# Patient Record
Sex: FEMALE | Race: WHITE | NOT HISPANIC OR LATINO | Employment: FULL TIME | URBAN - METROPOLITAN AREA
[De-identification: names, ages, dates, MRNs, and addresses within clinical notes are randomized per-mention and may not be internally consistent; named-entity substitution may affect disease eponyms.]

---

## 2017-06-05 ENCOUNTER — GENERIC CONVERSION - ENCOUNTER (OUTPATIENT)
Dept: OTHER | Facility: OTHER | Age: 37
End: 2017-06-05

## 2017-06-27 ENCOUNTER — GENERIC CONVERSION - ENCOUNTER (OUTPATIENT)
Dept: OTHER | Facility: OTHER | Age: 37
End: 2017-06-27

## 2017-07-05 ENCOUNTER — ALLSCRIPTS OFFICE VISIT (OUTPATIENT)
Dept: PERINATAL CARE | Facility: CLINIC | Age: 37
End: 2017-07-05
Payer: COMMERCIAL

## 2017-07-05 ENCOUNTER — GENERIC CONVERSION - ENCOUNTER (OUTPATIENT)
Dept: OTHER | Facility: OTHER | Age: 37
End: 2017-07-05

## 2017-07-05 ENCOUNTER — APPOINTMENT (OUTPATIENT)
Dept: PERINATAL CARE | Facility: CLINIC | Age: 37
End: 2017-07-05
Payer: COMMERCIAL

## 2017-07-05 PROCEDURE — 59015 CHORION BIOPSY: CPT | Performed by: OBSTETRICS & GYNECOLOGY

## 2017-07-05 PROCEDURE — 76801 OB US < 14 WKS SINGLE FETUS: CPT | Performed by: OBSTETRICS & GYNECOLOGY

## 2017-07-05 PROCEDURE — 76813 OB US NUCHAL MEAS 1 GEST: CPT | Performed by: OBSTETRICS & GYNECOLOGY

## 2017-07-05 PROCEDURE — 99203 OFFICE O/P NEW LOW 30 MIN: CPT | Performed by: GENETIC COUNSELOR, MS

## 2017-07-05 PROCEDURE — 76945 ECHO GUIDE VILLUS SAMPLING: CPT | Performed by: OBSTETRICS & GYNECOLOGY

## 2017-07-10 ENCOUNTER — GENERIC CONVERSION - ENCOUNTER (OUTPATIENT)
Dept: OTHER | Facility: OTHER | Age: 37
End: 2017-07-10

## 2017-08-01 ENCOUNTER — ALLSCRIPTS OFFICE VISIT (OUTPATIENT)
Dept: PERINATAL CARE | Facility: CLINIC | Age: 37
End: 2017-08-01
Payer: COMMERCIAL

## 2017-08-01 ENCOUNTER — GENERIC CONVERSION - ENCOUNTER (OUTPATIENT)
Dept: OTHER | Facility: OTHER | Age: 37
End: 2017-08-01

## 2017-08-01 PROCEDURE — 76805 OB US >/= 14 WKS SNGL FETUS: CPT | Performed by: OBSTETRICS & GYNECOLOGY

## 2017-09-25 ENCOUNTER — ALLSCRIPTS OFFICE VISIT (OUTPATIENT)
Dept: PERINATAL CARE | Facility: CLINIC | Age: 37
End: 2017-09-25
Payer: COMMERCIAL

## 2017-09-25 ENCOUNTER — GENERIC CONVERSION - ENCOUNTER (OUTPATIENT)
Dept: OTHER | Facility: OTHER | Age: 37
End: 2017-09-25

## 2017-09-25 PROCEDURE — 76817 TRANSVAGINAL US OBSTETRIC: CPT | Performed by: OBSTETRICS & GYNECOLOGY

## 2017-09-25 PROCEDURE — 76811 OB US DETAILED SNGL FETUS: CPT | Performed by: OBSTETRICS & GYNECOLOGY

## 2017-10-23 ENCOUNTER — GENERIC CONVERSION - ENCOUNTER (OUTPATIENT)
Dept: OTHER | Facility: OTHER | Age: 37
End: 2017-10-23

## 2017-11-16 ENCOUNTER — GENERIC CONVERSION - ENCOUNTER (OUTPATIENT)
Dept: OTHER | Facility: OTHER | Age: 37
End: 2017-11-16

## 2017-12-01 ENCOUNTER — GENERIC CONVERSION - ENCOUNTER (OUTPATIENT)
Dept: OTHER | Facility: OTHER | Age: 37
End: 2017-12-01

## 2017-12-01 ENCOUNTER — APPOINTMENT (OUTPATIENT)
Dept: PERINATAL CARE | Facility: CLINIC | Age: 37
End: 2017-12-01
Payer: COMMERCIAL

## 2017-12-01 PROCEDURE — 76816 OB US FOLLOW-UP PER FETUS: CPT | Performed by: OBSTETRICS & GYNECOLOGY

## 2017-12-28 ENCOUNTER — LAB REQUISITION (OUTPATIENT)
Dept: LAB | Facility: HOSPITAL | Age: 37
End: 2017-12-28
Payer: COMMERCIAL

## 2017-12-28 DIAGNOSIS — Z34.00 ENCOUNTER FOR SUPERVISION OF NORMAL FIRST PREGNANCY: ICD-10-CM

## 2017-12-28 PROCEDURE — 87653 STREP B DNA AMP PROBE: CPT | Performed by: OBSTETRICS & GYNECOLOGY

## 2017-12-30 LAB — GP B STREP DNA SPEC QL NAA+PROBE: ABNORMAL

## 2018-01-09 NOTE — PROGRESS NOTES
SEP 25 2017         RE: Nelli Ross                             To: NHAN Lei    MR#: [id number]   : 1980   ENC: [id number]                             Fax: [phone]   (Exam #: [id number])      The LMP of this 40year old,  G3, P2-0-0-2 patient was 2017, giving   her an EFE of 2018 and a current gestational age of 20 weeks 3 days   by dates  A sonographic examination was performed on SEP 25 2017 using   real time equipment  The ultrasound examination was performed using   abdominal & vaginal techniques  The patient has a BMI of 26 9  Her blood   pressure today was 108/68  Earliest ultrasound found in her record: 17   8w5d  18 EFE      Problem list:   1  AMA   2  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   3  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 and 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 on 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  Amniocentesis shows 46XX  Cardiac motion was observed at 147 bpm       INDICATIONS      advanced maternal age   Weston's syndrome   fetal anatomical survey      Exam Types      LEVEL II   Transvaginal      RESULTS      Fetus # 1 of 1   Breech presentation   Placenta Location = Anterior   No placenta previa   Placenta Grade = I      MEASUREMENTS (* Included In Average GA)      AC              18 6 cm        23 weeks 1 day * (44%)   BPD              5 3 cm        22 weeks 1 day * (21%)   HC              20 5 cm        22 weeks 4 days* (21%)   Femur            4 2 cm        24 weeks 0 days* (52%)      Humerus          3 8 cm        23 weeks 2 days  (42%)      Cerebellum       2 6 cm        24 weeks 4 days   Biorbit          3 6 cm        22 weeks 6 days   CisternaMagna    5 9 mm      HC/AC           1 10   FL/AC           0 23   FL/BPD          0 79   EFW (Ac/Fl/Hc)   594 grams - 1 lbs 5 oz                 (40%)      THE AVERAGE GESTATIONAL AGE is 23 weeks 0 days +/- 14 days        AMNIOTIC FLUID         Largest Vertical Pocket = 7 5 cm   Amniotic Fluid: Normal      CERVICAL EVALUATION      SUPINE      Cervical Length: 3 33 cm      OTHER TEST RESULTS           Funneling?: No             Dynamic Changes?: No        Resp  To TFP?: No      ANATOMY      Head                                    Normal   Face/Neck                               Normal   Th  Cav  Normal   Heart                                   Normal   Abd  Cav  Normal   Stomach                                 Normal   Right Kidney                            Normal   Left Kidney                             Normal   Bladder                                 Normal   Abd  Wall                               Normal   Spine                                   Normal   Extrems                                 Normal   Genitalia                               Normal   Placenta                                Normal   Umbl  Cord                              Normal   Uterus                                  Normal   PCI                                     Normal      ANATOMY DETAILS      Visualized Appearing Sonographically Normal:   HEAD: (Calvarium, BPD Level, Cavum, Lateral Ventricles, Choroid Plexus,   Cerebellum, Cisterna Magna);    FACE/NECK: (Neck, Profile, Orbits,   Nose/Lips, Palate, Face);    TH  CAV  : (Lungs, Diaphragm); HEART: (Four   Chamber View, Proximal Left Outflow, Proximal Right Outflow, 3VV, 3 Vessel   Trachea, Short Axis of Greater Vessels, Ductal Arch, Aortic Arch,   Interventricular Septum, Interatrial Septum, IVC, SVC, Cardiac Axis,   Cardiac Position);    ABD  CAV : (Liver);    STOMACH, RIGHT KIDNEY, LEFT   KIDNEY, BLADDER, ABD  WALL, SPINE: (Cervical Spine, Thoracic Spine, Lumbar   Spine, Sacrum);    EXTREMS: (Lt Humerus, Rt Humerus, Lt Forearm, Rt   Forearm, Lt Hand, Rt Hand, Lt Femur, Rt Femur, Lt Low Leg, Rt Low Leg, Lt   Foot, Rt Foot);    GENITALIA, PLACENTA, UMBL   CORD, UTERUS, PCI      ANATOMY COMMENTS       Her survey of the fetal anatomy is complete  No fetal structural abnormality or ultrasound marker for aneuploidy is   identified on the Level II ultrasound study today  Fetal growth and   amniotic fluid volume appear normal   Active movement of the fetal body &   extremities was seen  There is no suspicion of a subchorionic bleed  The   placental cord insertion was normal       ADNEXA      The left ovary appeared normal and measured 2 7 x 2 8 x 0 9 cm with a   volume of 3 6 cc  The right ovary appeared normal and measured 1 9 x 2 4 x   1 2 cm with a volume of 2 9 cc  IMPRESSION      Wolfe IUP   23 weeks and 0 days by this ultrasound  (EFE=JAN 22 2018)   Breech presentation   Normal anatomy survey   Regular fetal heart rate of 147 bpm   Anterior placenta   No placenta previa      GENERAL COMMENT      Recommend a 32 week US for growth due to her hx of being advanced maternal   age  She can have this done locally  EVERARDO Roque M D     Maternal-Fetal Medicine   Electronically signed 09/25/17 15:37

## 2018-01-10 NOTE — PROGRESS NOTES
AUG 1 2017         RE: Kayla Feeling                             To: Michael Guzman,   M D    MR#: [id number]   : [address] [place] [address] Avenue: [id number]                             Fax: [phone]   (Exam #: [id number])      The LMP of this 40year old,  G3, P2-0-0-2 patient was 2017, giving   her an EFE of 2018 and a current gestational age of 14 weeks 4 days   by dates  A sonographic examination was performed on AUG 1 2017 using real   time equipment  The ultrasound examination was performed using abdominal   technique  The patient has a BMI of 24 5  Her blood pressure today was   107/58  Earliest ultrasound found in her record: 17   8w5d  18 EFE      Problem list:   1  AMA   2  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   3  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 and 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 on 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      Cardiac motion was observed at 148 bpm       INDICATIONS      advanced maternal age   Weston's syndrome      Exam Types      Level I   Amnio (15+ Wks) W/Sono Guidance      RESULTS      Fetus # 1 of 1   Breech presentation   Placenta Location = Anterior   No placenta previa   Placenta Grade = 0      AMNIOTIC FLUID         Largest Vertical Pocket = 3 9 cm   Amniotic Fluid: Normal      MEASUREMENTS (* Included In Average GA)      AC               9 8 cm        15 weeks 4 days* (58%)   BPD              3 1 cm        15 weeks 5 days*   HC              11 6 cm        15 weeks 4 days*   Femur            1 8 cm        15 weeks 1 day *      Nuchal Fold      2 4 mm      Humerus          1 8 cm        15 weeks 1 day   (39%)      Cerebellum       1 5 cm        15 weeks 5 days   CisternaMagna    2 7 mm      HC/AC           1 18   FL/AC           0 18   FL/BPD          0 57   EFW (Ac/Fl/Hc)   127 grams - 0 lbs 4 oz      THE AVERAGE GESTATIONAL AGE is 15 weeks 4 days +/- 7 days        ANATOMY      Head                                    See Details Face/Neck                               See Details   Th  Cav  Normal   Heart                                   See Details   Abd  Cav  Normal   Stomach                                 Normal   Right Kidney                            Normal   Left Kidney                             Normal   Bladder                                 Normal   Abd  Wall                               Normal   Spine                                   Normal   Extrems                                 Normal   Genitalia                               Normal   Placenta                                Normal   Umbl  Cord                              Normal   Uterus                                  Normal   PCI                                     Normal      ANATOMY COMMENTS      Her survey of the fetal anatomy is not complete  No fetal structural abnormality or ultrasound marker for aneuploidy is   identified on the Level II ultrasound study today  The missed or limited   views above are secondary to her early gestational age  Fetal growth and   amniotic fluid volume appear normal   Active movement of the fetal body &   extremities was seen  There is no suspicion of a subchorionic bleed  The   placental cord insertion was normal       ADNEXA      The left ovary appeared normal and measured 2 7 x 3 3 x 1 8 cm with a   volume of 8 4 cc  The right ovary appeared normal and measured 2 6 x 2 1 x   2 4 cm with a volume of 6 9 cc  PROCEDURES      Amniocentesis (Genetic) was performed by NHAN Rothman  under   ultrasound guidance  On the first attempt 20 cc of clear fluid were   obtained  The fluid was sent for Karyotype, AFP and Genetic  The 22 gauge   needle did not traverse the placenta  There were no complications during   this procedure        The risks and benefits of an amniocentesis were reviewed with the patient   including the 1/200-1/800 risk for fetal or maternal infection which in   theory could lead to fetal demise or loss of future maternal reproduction   capability, rupture of membranes, onset of  labor, bleeding or need   for emergent delivery  The fluid was sent for fetal chromosomes, amniotic fluid alpha-fetoprotein   and Fluorescence in situ hybridization  Fluorescence in situ hybridization   will screen specifically for trisomy 15, trisomy 25, trisomy 24 and any   sex chromosome problem with results usually available in 4 days  The   chromosome and alpha-fetoprotein analyses are normally completed within   10-14 days  You will be contacted as soon as they are available  Post procedure fetal heart movement was documented  The patient is Rh Positive, and Rhogam was not administered  IMPRESSION      Wolfe IUP   15 weeks and 4 days by this ultrasound  (EFE=2018)   Breech presentation   Regular fetal heart rate of 148 bpm   Anterior placenta   No placenta previa   Successful amniocentesis      GENERAL COMMENT      The patient was informed of the findings and counseled about the   limitations of the exam in detecting all forms of fetal congenital   abnormalities  Due to the possible findings of mosaicism she was offered invasive genetic   testing  Options for prenatal diagnosis discussed included an   amniocentesis  Risks and benefits of an amniocentesis were reviewed   including an increased risk of loss of 0 5% over the baseline risk  It was   explained that normal chromosomes and a normal ultrasound do not guarantee   a normal baby nor a normal pregnancy outcome  She denies any vaginal bleeding or uterine cramping/contractions  Exam shows she is comfortable and her abdomen is nontender  Patient verbalizes understanding and requests an amniocentesis at todays   visit as she would terminate the pregnancy in the event that a mosaic   ching syndrome is found   The amniocentesis was completed without complication  Follow up recommended:   1  A 20 week US is recommended  She will be on an internal medicine   rotation at 20 weeks and does not feel she can take off time for an US  Rather she scheduled her next US at 23 weeks  Colton Arrington was reassuring but   she is aware that a 20 week review of the heart is limited especially in   the area of the cardiac septum  The face to face time, in addition to time spent discussing ultrasound   results, was approximately 10 minutes, greater than 50% of which was spent   during counseling and coordination of care  EVERARDO Garnett M D     Maternal-Fetal Medicine   Electronically signed 08/01/17 13:20

## 2018-01-12 NOTE — PROGRESS NOTES
2017         RE: Mani De La Torre                             To: Edith NHAN Rowley    MR#: [id number]   : [address] [place] [address] Avenue: [id number]                             Fax: [phone]   (Exam #: [id number])      The LMP of this 40year old,  G3, P2-0-0-2 patient was 2017, giving   her an EFE of 2018 and a current gestational age of 5 weeks 5 days   by dates  A sonographic examination was performed on 2017 using real   time equipment  The ultrasound examination was performed using abdominal   technique  The patient has a BMI of 23 5  Her blood pressure today was   115/58  Earliest ultrasound found in her record: 17   8w5d  18 EFE   Wes Silverio is on prenatal vitamins and reports an allergy to ciprofloxacin and   denies any use of cigarettes, alcohol or illicit drugs  Her past medical   history is significant for advanced maternal age and a recent cell free   DNA screen showed an increased risk for fetal Weston syndrome  She met   with our genetic counselor  Please see that consult under a different   cover  She reports a prior appendectomy and denied any first generation   family history of hypertension, diabetes, thrombosis or congenital   anomalies  She's had 2 prior pregnancies at term that weighed between 3630   g to 3960 gs and were uncomplicated  She is interested in invasive genetic   screening with a CVS today as she would consider termination of the   pregnancy if the diagnosis of Weston's were found on CVS as well  Multiple longitudinal and transverse sections revealed a ellis   intrauterine pregnancy with the fetus in variable presentation  The   placenta is anterior in implantation, grade 0 in appearance, and there is   no placenta previa        Cardiac motion was observed at 158 bpm       INDICATIONS      advanced maternal age   Weston's syndrome      Exam Types      Level I   CVS      RESULTS      Fetus # 1 of 1   Variable presentation      MEASUREMENTS (* Included In Average GA)      CRL              5 4 cm        11 weeks 6 days*   Nuchal Trans    1 20 mm      THE AVERAGE GESTATIONAL AGE is 11 weeks 6 days +/- 7 days  ANATOMY COMMENTS      Anatomic detail is limited at this gestational age  The yolk sac was not   noted  The fetal cranium appeared normal in shape and the nuchal   translucency was normal in size (1 2mm)  The nasal bone appears to be   present  The intracranial anatomy was unremarkable  Anatomy of the fetal   thorax appeared within normal limits  The cardiac rhythm was regular  Within the abdomen,  the abdominal wall appeared intact  A three vessel   cord appears to be present  Active movement of the fetal body &   extremities was seen  There is no suspicion of a subchorionic bleed  The   placental cord insertion was normal    There is no suspicion of a uterine   myoma  Free fluid is not seen in the posterior cul-de-sac  The stomach and   bladder views were limited by the early gestational age  ADNEXA      The left ovary appeared normal and measured 2 9 x 3 1 x 1 7 cm with a   volume of 8 0 cc  The right ovary appeared normal and measured 2 8 x 1 8 x   1 7 cm with a volume of 4 5 cc  PROCEDURES      Transcervical chorionic villus sampling was performed by NHAN Paris  under ultrasound guidance  On the first pass, villi were obtained   using a 18 gauge needle  Tissue was sent for [facility] and Karyotype  Risks and benefits of chorionic villus sampling (CVS) were reviewed  Complications from CVS occur in about 1 out of every 200(0 5%) - 1/500   (0 2%)  procedures that are performed, which is similar to the risk of   complications from amniocentesis  Complications include vaginal spotting   or bleeding, leakage of amniotic fluid, severe cramping, fever, infection,   or miscarriage   In rare cases an adequate sample is not obtained and   another CVS or a later amniocentesis can be offered to obtain fetal DNA   for chromosome analysis  In approximately one in a hundred cases, a CVS   may return showing placental mosaicsm (2 or more cell lines)  This may be   because the sample can include cells from both the baby and the mother or   occasional differences in chromosomes between the placenta and the fetus  If the results need to be clarified, this is usually performed with an   amniocentesis test, performed between 15 and 18 weeks of the pregnancy  In   the early 1990s, limb shortening defects (underdeveloped fingers or toes)   were reported in association with the CVS procedure in two case studies  These limb defects were noted mostly when CVS was performed before the   10th week after the last menstrual period  Now CVS is routinely performed   between the 10th and 12th weeks of pregnancy  More recent studies provide   reassurance that there is not an increased risk for limb reduction defects   with CVS performed at and after 10 weeks of pregnancy  The sample today   was sent for Fluorescence in situ hybridization which will screen   specifically for trisomy 15, trisomy 25, trisomy 24 and any sex chromosome   problem with results usually available in 4 days  The full chromosome are   normally completed within 10-14 days  Your patient and your office will be   contacted as soon as they are available  CVS cannot detect a spina bifida   and other neural tube defects and therefore we recommend that women have   maternal serum alpha-fetoprotein (AFP) screening in addition to CVS  This   blood test is performed at 15 to 18 weeks of pregnancy  We notified the   patient that CVS does not detect all birth defects or genetic diseases,   since prenatal diagnosis is not yet available for many conditions  Every   couple in the population has a 3-4% chance to have a child with a birth   defect or health problem, regardless of their   family/pregnancy/environmental histories, or CVS results        Post procedure fetal heart movement was documented  The patient is Rh Positive, and Rhogam was not administered  AMNIOTIC FLUID         Largest Vertical Pocket = 3 2 cm   Amniotic Fluid: Normal      IMPRESSION      Wolfe IUP   11 weeks and 6 days by this ultrasound  (EFE=JAN 18 2018)   Variable presentation   Regular fetal heart rate of 158 bpm   Anterior placenta   No placenta previa   Transcervical CVS performed      RECOMMENDATION      Fetal Anatomic Survey: at 20 weeks      EVERARDO Maciel M D     Maternal-Fetal Medicine   Electronically signed 07/05/17 19:09

## 2018-01-15 VITALS
DIASTOLIC BLOOD PRESSURE: 58 MMHG | SYSTOLIC BLOOD PRESSURE: 107 MMHG | BODY MASS INDEX: 24.41 KG/M2 | HEIGHT: 64 IN | WEIGHT: 143 LBS

## 2018-01-15 VITALS
SYSTOLIC BLOOD PRESSURE: 108 MMHG | DIASTOLIC BLOOD PRESSURE: 68 MMHG | BODY MASS INDEX: 26.81 KG/M2 | HEIGHT: 64 IN | WEIGHT: 157.01 LBS

## 2018-01-22 VITALS
DIASTOLIC BLOOD PRESSURE: 58 MMHG | HEIGHT: 64 IN | BODY MASS INDEX: 23.39 KG/M2 | WEIGHT: 137 LBS | SYSTOLIC BLOOD PRESSURE: 115 MMHG

## 2018-01-23 NOTE — PROGRESS NOTES
DEC 1 2017         RE: Rosa Baltazar                             To: ALEJANDRINA Gorman    MR#: [id number]                                  [address] [facility]   : [address] 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   3  CVS showed 45,X on the FISH result and 55 XX on the karyotype   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  Cardiac motion was observed at 139 bpm       INDICATIONS      advanced maternal age   fetal growth      Exam Types      Level I      RESULTS      Fetus # 1 of 1   Vertex presentation   Fetal growth appeared normal   Placenta Location = Anterior   No placenta previa   Placenta Grade = II      MEASUREMENTS (* Included In Average GA)      AC              30 3 cm        34 weeks 3 days* (70%)   BPD              8 3 cm        33 weeks 1 day * (47%)   HC              28 9 cm        31 weeks 4 days* (8%)   Femur            6 3 cm        32 weeks 2 days* (40%)      Cerebellum       4 3 cm        34 weeks 5 days      HC/AC           0 95   FL/AC           0 21   FL/BPD          0 76   EFW (Ac/Fl/Hc)  2161 grams - 4 lbs 12 oz                 (48%)      THE AVERAGE GESTATIONAL AGE is 32 weeks 6 days +/- 18 days        AMNIOTIC FLUID      Q1: 3 9      Q2: 3 4      Q3: 5 1 Q4: 3 3   MICHELLE Total = 15 7 cm   Amniotic Fluid: Normal      ANATOMY COMMENTS      Fetal anatomy has been previously assessed and documented in our Center   and no anomalies were identified  No fetal structural abnormality is   identified on the Level I survey today  Follow up intracranial anatomy   (calvarium, cavum, lateral ventricles, and cerebellum), cardiac anatomy   (4chamber, LVOT, RVOT, and 3VV), diaphragm, stomach, kidneys, and bladder   appear normal       IMPRESSION      Wolfe IUP   32 weeks and 6 days by this ultrasound  (EFE=2018)   Vertex presentation   Fetal growth appeared normal   Regular fetal heart rate of 139 bpm   Anterior placenta   No placenta previa      GENERAL COMMENT       On exam today, Latosha Edu appears well, in no acute distress, and denies any   complaints  Her abdomen is non-tender  There has been appropriate interval fetal growth  Good fetal movement and   tone are seen  The amniotic fluid volume appears normal   The patient was   informed of today's findings and all of her questions were answered  The   limitations of ultrasound were reviewed   labor precautions and   fetal kick counts were reviewed  We discussed follow-up in detail and I recommend the patient return as   clinically indicated  Thank you very much for allowing us to participate in the care of this   very nice patient  Should you have any questions, please do not hesitate   to contact our office  Portions of the record may have been created with voice recognition   software  Occasional wrong word or "sound a like" substitutions may have   occurred due to the inherent limitations of voice recognition software  Read the chart carefully and recognize, using context, where substitutions   have occurred  EVERARDO Mcgraw M D     Electronically signed 17 15:15

## 2018-01-24 VITALS
HEART RATE: 87 BPM | DIASTOLIC BLOOD PRESSURE: 60 MMHG | WEIGHT: 168.03 LBS | SYSTOLIC BLOOD PRESSURE: 127 MMHG | BODY MASS INDEX: 28.69 KG/M2 | HEIGHT: 64 IN